# Patient Record
Sex: MALE | Race: WHITE | NOT HISPANIC OR LATINO | Employment: OTHER | URBAN - METROPOLITAN AREA
[De-identification: names, ages, dates, MRNs, and addresses within clinical notes are randomized per-mention and may not be internally consistent; named-entity substitution may affect disease eponyms.]

---

## 2022-04-19 ENCOUNTER — PREPPED CHART (OUTPATIENT)
Dept: URBAN - METROPOLITAN AREA CLINIC 27 | Facility: CLINIC | Age: 37
End: 2022-04-19

## 2022-04-20 ENCOUNTER — COMPREHENSIVE EXAM (OUTPATIENT)
Dept: URBAN - METROPOLITAN AREA CLINIC 27 | Facility: CLINIC | Age: 37
End: 2022-04-20

## 2022-04-20 DIAGNOSIS — H52.223: ICD-10-CM

## 2022-04-20 PROCEDURE — 92015 DETERMINE REFRACTIVE STATE: CPT

## 2022-04-20 PROCEDURE — 92014 COMPRE OPH EXAM EST PT 1/>: CPT

## 2022-04-20 ASSESSMENT — KERATOMETRY
OD_AXISANGLE2_DEGREES: 174
OD_K1POWER_DIOPTERS: 44.00
OS_AXISANGLE2_DEGREES: 171
OD_AXISANGLE_DEGREES: 084
OS_K2POWER_DIOPTERS: 45.50
OS_K1POWER_DIOPTERS: 43.75
OD_K2POWER_DIOPTERS: 45.25
OS_AXISANGLE_DEGREES: 081

## 2022-04-20 ASSESSMENT — TONOMETRY
OD_IOP_MMHG: 14
OS_IOP_MMHG: 14

## 2022-04-20 ASSESSMENT — VISUAL ACUITY
OS_CC: 20/20
OD_CC: 20/20

## 2022-04-20 NOTE — PATIENT DISCUSSION
Wants to hold on contact lenses for now. Discussed possible refractive surgery consultation Dr. Yola THURMAN.

## 2023-06-09 ENCOUNTER — COMPREHENSIVE EXAM (OUTPATIENT)
Dept: URBAN - METROPOLITAN AREA CLINIC 27 | Facility: CLINIC | Age: 38
End: 2023-06-09

## 2023-06-09 DIAGNOSIS — H52.223: ICD-10-CM

## 2023-06-09 PROCEDURE — 92014 COMPRE OPH EXAM EST PT 1/>: CPT

## 2023-06-09 PROCEDURE — 92015 DETERMINE REFRACTIVE STATE: CPT

## 2023-06-09 ASSESSMENT — KERATOMETRY
OD_AXISANGLE_DEGREES: 084
OD_K2POWER_DIOPTERS: 45.25
OS_K1POWER_DIOPTERS: 43.75
OS_K2POWER_DIOPTERS: 45.50
OS_AXISANGLE_DEGREES: 081
OD_AXISANGLE2_DEGREES: 174
OD_K1POWER_DIOPTERS: 44.00
OS_AXISANGLE2_DEGREES: 171

## 2023-06-09 ASSESSMENT — VISUAL ACUITY
OD_CC: 20/20
OS_CC: 20/20

## 2023-06-09 ASSESSMENT — TONOMETRY
OD_IOP_MMHG: 11
OS_IOP_MMHG: 11

## 2024-06-21 ENCOUNTER — COMPREHENSIVE EXAM (OUTPATIENT)
Dept: URBAN - METROPOLITAN AREA CLINIC 27 | Facility: CLINIC | Age: 39
End: 2024-06-21

## 2024-06-21 DIAGNOSIS — H52.223: ICD-10-CM

## 2024-06-21 DIAGNOSIS — D31.31: ICD-10-CM

## 2024-06-21 PROCEDURE — 92250E RETINAL SCREENING, ELECTIVE

## 2024-06-21 PROCEDURE — 92015 DETERMINE REFRACTIVE STATE: CPT

## 2024-06-21 PROCEDURE — 99214 OFFICE O/P EST MOD 30 MIN: CPT

## 2024-06-21 PROCEDURE — 92499RS OCT RETINAL SCREENING, ELECTIVE

## 2024-06-21 ASSESSMENT — KERATOMETRY
OS_K2POWER_DIOPTERS: 45.50
OS_K1POWER_DIOPTERS: 43.75
OS_AXISANGLE2_DEGREES: 171
OD_AXISANGLE2_DEGREES: 174
OD_K2POWER_DIOPTERS: 45.25
OS_AXISANGLE_DEGREES: 081
OD_K1POWER_DIOPTERS: 44.00
OD_AXISANGLE_DEGREES: 084

## 2024-06-21 ASSESSMENT — VISUAL ACUITY
OU_CC: J1+
OS_CC: 20/20
OD_CC: 20/20

## 2024-06-21 ASSESSMENT — TONOMETRY
OS_IOP_MMHG: 16
OD_IOP_MMHG: 18

## 2025-06-19 ENCOUNTER — COMPREHENSIVE EXAM (OUTPATIENT)
Age: 40
End: 2025-06-19

## 2025-06-19 DIAGNOSIS — D31.31: ICD-10-CM

## 2025-06-19 DIAGNOSIS — H35.033: ICD-10-CM

## 2025-06-19 DIAGNOSIS — H52.223: ICD-10-CM

## 2025-06-19 PROCEDURE — 92499RS OCT RETINAL SCREENING, ELECTIVE

## 2025-06-19 PROCEDURE — 99214 OFFICE O/P EST MOD 30 MIN: CPT

## 2025-06-19 PROCEDURE — 92015 DETERMINE REFRACTIVE STATE: CPT

## 2025-06-19 PROCEDURE — 92250E RETINAL SCREENING, ELECTIVE
